# Patient Record
Sex: MALE | Race: WHITE | NOT HISPANIC OR LATINO | URBAN - METROPOLITAN AREA
[De-identification: names, ages, dates, MRNs, and addresses within clinical notes are randomized per-mention and may not be internally consistent; named-entity substitution may affect disease eponyms.]

---

## 2023-01-30 ENCOUNTER — TELEHEALTH PROVIDED OTHER THAN IN PATIENT'S HOME (OUTPATIENT)
Dept: URBAN - METROPOLITAN AREA TELEHEALTH 12 | Facility: TELEHEALTH | Age: 33
End: 2023-01-30

## 2023-01-30 VITALS — WEIGHT: 180 LBS | HEIGHT: 69 IN

## 2023-01-30 DIAGNOSIS — R19.8 OTHER SPECIFIED SYMPTOMS AND SIGNS INVOLVING THE DIGESTIVE S: ICD-10-CM

## 2023-01-30 DIAGNOSIS — K63.89 OTHER SPECIFIED DISEASES OF INTESTINE: ICD-10-CM

## 2023-01-30 PROCEDURE — 99204 OFFICE O/P NEW MOD 45 MIN: CPT | Mod: 95 | Performed by: INTERNAL MEDICINE

## 2023-01-30 NOTE — SERVICEHPINOTES
PATIENT VERIFIED BY DATE OF BIRTH AND NAME. Patient has been consented for this telecommunication visit.
br
32yoM with history of alternating constipation with diarrhea since 2020. Reports episodes of constipation with no BM for 3-4 days and hard stools but also reports episodes of post prandial urgency and diarrhea. He has cramping and bloating that improves with BM. He otherwise denies bloody or black stools. No weight loss, nausea or emesis. Prior trial of Metamucil caused bloating. Previously evaluated by PCP and SIBO breath test +ve and was treated with Rifaximin for 2 weeks
 No dairy products and eats high fiber diet.br
br Takes Naproxen as needed.  br No supplements
br
br Family history of CRC-- both grandfathers. No first degree relative with CRC.brNo Celiac disease or IBD.br
brAll 10 point review of systems have been reviewed as per HPI and otherwise negative. span id="{[id number]}" class="narrative freetextSelected" type="freetext" canedit="true" suppressed="false" nid="[id number]" gid="{[id number]}" bound="false" visited="true" br br
br . /spanspan id="{[id number]}" class="narrative placeholderNormal" type="placeholder" canedit="true" suppressed="false" nid="[id number]" gid="{[id number]}" propertyname="rosWording" displayname="ROS Wording" tooltip="" handler="" handlerdata="" datatype="text" mandatory="false" requires="" allowmultipleentries="" describes="" tagname="" prototype="" dontshowempty="false" empty="true" onmouseover="__NarrativeOnMouseOver('{[id number]}')" onmouseout="__NarrativeOnMouseOut('{[id number]}')" onmousedown="__NarrativePlaceholderClicked('{[id number]}')"[ROS Wording]/spanspan id="{[id number]}" class="narrative freetextNormal" type="freetext" canedit="true" suppressed="false" nid="[id number]" gid="{[id number]}" bound="false" /span

## 2023-03-16 ENCOUNTER — TELEHEALTH PROVIDED IN PATIENT'S HOME (OUTPATIENT)
Dept: URBAN - METROPOLITAN AREA TELEHEALTH 3 | Facility: TELEHEALTH | Age: 33
End: 2023-03-16

## 2023-03-16 VITALS — HEIGHT: 69 IN | WEIGHT: 180 LBS

## 2023-03-16 DIAGNOSIS — R19.8 OTHER SPECIFIED SYMPTOMS AND SIGNS INVOLVING THE DIGESTIVE S: ICD-10-CM

## 2023-03-16 DIAGNOSIS — R14.0 ABDOMINAL DISTENSION (GASEOUS): ICD-10-CM

## 2023-03-16 DIAGNOSIS — Z80.0 FAMILY HISTORY OF MALIGNANT NEOPLASM OF DIGESTIVE ORGANS: ICD-10-CM

## 2023-03-16 PROCEDURE — 99214 OFFICE O/P EST MOD 30 MIN: CPT | Mod: 95 | Performed by: INTERNAL MEDICINE

## 2023-03-16 RX ORDER — RIFAXIMIN 550 MG/1
1650 TABLET ORAL
Qty: 42 | Refills: 0 | Status: ACTIVE
Start: 2023-03-16

## 2023-03-16 NOTE — SERVICEHPINOTES
PATIENT VERIFIED BY DATE OF BIRTH AND NAME. Patient has been consented for this telecommunication visit.
brPatient's visit was conducted through video telecommunication. Patient consented before the start of visit as to understanding of privacy concerns, possible technological failure, and their responsibility of carrying out instructions of plan. Provider was located in their home during this visit.
br

br Last visit 1/30/23
lg22ckV with history of alternating constipation with diarrhea since 2020. Reports episodes of constipation with no BM for 3-4 days and hard stools but also reports episodes of post prandial urgency and diarrhea. He has cramping and bloating that improves with BM. He otherwise denies bloody or black stools. No weight loss, nausea or emesis. Prior trial of Metamucil caused bloating. Previously evaluated by PCP and SIBO breath test +ve and was treated with Rifaximin for 2 weeks with improvement of his symptoms. He does not consume dairy products and eats high fiber diet. Takes Naproxen as needed. No supplements. No recent labs or stool tests. No prior EGD, colonoscopy or abdominal imaging.Family history of CRC-- both grandfathers. No first degree relative with CRC.brNo Celiac disease or IBD.br
brInterval history 
br Symptoms unchanged from above. He has not done the tests ordered last visit .
brToday he also mentioned having epigastric gas trapping sensation with difficulty burping.
brDenies any classic reflux symptoms. No dysphagia, weight loss, nausea  or emesis.br   
br

brAll 10 point review of systems have been reviewed as per HPI and otherwise negative. span id="{[id number]}" class="narrative freetextSelected" type="freetext" canedit="true" suppressed="false" nid="[id number]" gid="{[id number]}" bound="false" visited="true" br
br
br
br  /spanspan id="{[id number]}" class="narrative placeholderNormal" type="placeholder" canedit="true" suppressed="false" nid="[id number]" gid="{[id number]}" propertyname="rosWording" displayname="ROS Wording" tooltip="" handler="" handlerdata="" datatype="text" mandatory="false" requires="" allowmultipleentries="" describes="" tagname="" prototype="" dontshowempty="false" empty="true" onmouseover="__NarrativeOnMouseOver('{[id number]}')" onmouseout="__NarrativeOnMouseOut('{[id number]}')" onmousedown="__NarrativePlaceholderClicked('{[id number]}')"[ROS Wording]/spanspan id="{[id number]}" class="narrative freetextNormal" type="freetext" canedit="true" suppressed="false" nid="[id number]" gid="{[id number]}" bound="false" /span

## 2023-03-16 NOTE — SERVICENOTES
Will start with noninvasive testing above given absence of alarm symptoms.
Ddx SIBO, IBS-mixed, IBD, Celiac, H.pylori. Will empirically treat for SIBO given prior history. If he responds to SIBO treatment, then will hold off on endoscopic eval at this time. If no response or based on lab results above, will proceed with endoscopic evaluation with colonoscopy and EGD.

## 2023-03-17 LAB
CELIAC DISEASE COMPREHENSIVE: DEAMIDATED GLIADIN ABS, IGA: 7 UNITS (ref 0–19)
CELIAC DISEASE COMPREHENSIVE: DEAMIDATED GLIADIN ABS, IGG: 2 UNITS (ref 0–19)
CELIAC DISEASE COMPREHENSIVE: ENDOMYSIAL ANTIBODY IGA: NEGATIVE
CELIAC DISEASE COMPREHENSIVE: IMMUNOGLOBULIN A, QN, SERUM: 488 MG/DL — HIGH (ref 90–386)
CELIAC DISEASE COMPREHENSIVE: T-TRANSGLUTAMINASE (TTG) IGA: <2 U/ML
CELIAC DISEASE COMPREHENSIVE: T-TRANSGLUTAMINASE (TTG) IGG: <2 U/ML